# Patient Record
Sex: MALE | Race: WHITE | HISPANIC OR LATINO | Employment: UNEMPLOYED | URBAN - METROPOLITAN AREA
[De-identification: names, ages, dates, MRNs, and addresses within clinical notes are randomized per-mention and may not be internally consistent; named-entity substitution may affect disease eponyms.]

---

## 2019-06-29 ENCOUNTER — HOSPITAL ENCOUNTER (EMERGENCY)
Facility: HOSPITAL | Age: 34
Discharge: HOME | End: 2019-06-29
Attending: EMERGENCY MEDICINE

## 2019-06-29 VITALS
RESPIRATION RATE: 18 BRPM | HEART RATE: 80 BPM | BODY MASS INDEX: 26.52 KG/M2 | SYSTOLIC BLOOD PRESSURE: 120 MMHG | WEIGHT: 165 LBS | HEIGHT: 66 IN | TEMPERATURE: 98 F | OXYGEN SATURATION: 100 % | DIASTOLIC BLOOD PRESSURE: 71 MMHG

## 2019-06-29 DIAGNOSIS — S22.030A TRAUMATIC COMPRESSION FRACTURE OF T3 THORACIC VERTEBRA, CLOSED, INITIAL ENCOUNTER: ICD-10-CM

## 2019-06-29 DIAGNOSIS — S22.060A: Primary | ICD-10-CM
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
## 2019-06-29 DIAGNOSIS — M79.672 LEFT FOOT PAIN: ICD-10-CM

## 2019-06-29 PROCEDURE — 25000003 PHARM REV CODE 250: Performed by: NURSE PRACTITIONER

## 2019-06-29 PROCEDURE — 99283 EMERGENCY DEPT VISIT LOW MDM: CPT | Mod: 25

## 2019-06-29 PROCEDURE — 99284 EMERGENCY DEPT VISIT MOD MDM: CPT | Mod: 25

## 2019-06-29 RX ORDER — IBUPROFEN 600 MG/1
600 TABLET ORAL
Status: COMPLETED | OUTPATIENT
Start: 2019-06-29 | End: 2019-06-29

## 2019-06-29 RX ORDER — HYDROCODONE BITARTRATE AND ACETAMINOPHEN 5; 325 MG/1; MG/1
1 TABLET ORAL EVERY 6 HOURS PRN
Qty: 12 TABLET | Refills: 0 | Status: SHIPPED | OUTPATIENT
Start: 2019-06-29

## 2019-06-29 RX ADMIN — IBUPROFEN 600 MG: 600 TABLET, FILM COATED ORAL at 01:06

## 2019-06-29 NOTE — PROGRESS NOTES
TN received call from YAMIL Buck, pt in ED requires TLSO brace.     TN  Contacted [facility], [facility], [phone]. TN spoke with their on call, Margy. She is currently in [place], she should be here in about one hour and forty-five minutes, no later than 5:15 pm.

## 2019-06-29 NOTE — ED PROVIDER NOTES
Encounter Date: 6/29/2019       History     Chief Complaint   Patient presents with    Fall     fall while moving a bed. denies pain. reports back pain and left foot pain.      34yo male presents to the ED for evaluation after a fall.  Pt was carrying a bed in his home when he tripped and fell, landing on his back onto a hard floor about an hour ago.  No head injury or LOC.  Pt had an immediate onset of back pain.  He reports pain is worse with deep breaths.  No SOB, cough, CP, abd pain, weakness, numbness/tingling.  Pt was ambulatory in to the ED.  He also reports painful ambulation on left foot since the fall.  He has taken a goody's powder and applied an icy hot patch to his back but the pain persists.  No other complaints at this time.     The history is provided by the patient. The history is limited by a language barrier. No  was used (Pt's friend Sherman JUDD acted as .  Also used [employer]).     Review of patient's allergies indicates:  No Known Allergies  No past medical history on file.  No past surgical history on file.  No family history on file.  Social History     Tobacco Use    Smoking status: Never Smoker   Substance Use Topics    Alcohol use: Never     Frequency: Never    Drug use: Never     Review of Systems   Constitutional: Positive for activity change. Negative for fever.   Respiratory: Negative for cough and shortness of breath.    Cardiovascular: Negative for chest pain.   Gastrointestinal: Negative for abdominal pain, nausea and vomiting.   Musculoskeletal: Positive for arthralgias and back pain. Negative for joint swelling, myalgias and neck pain.   Skin: Negative.  Negative for wound.   Allergic/Immunologic: Negative for immunocompromised state.   Neurological: Negative for dizziness, syncope, weakness, light-headedness, numbness and headaches.   Hematological: Does not bruise/bleed easily.   Psychiatric/Behavioral: Negative for confusion.   All other systems  reviewed and are negative.      Physical Exam     Initial Vitals [06/29/19 1301]   BP Pulse Resp Temp SpO2   120/70 83 19 97.8 °F (36.6 °C) 98 %      MAP       --         Physical Exam    Nursing note and vitals reviewed.  Constitutional: Vital signs are normal. He appears well-developed and well-nourished. He is active and cooperative. He is easily aroused.  Non-toxic appearance. He does not have a sickly appearance. He does not appear ill. No distress.   HENT:   Head: Normocephalic and atraumatic.   Eyes: Conjunctivae are normal.   Neck: Normal range of motion, full passive range of motion without pain and phonation normal. Neck supple. No spinous process tenderness and no muscular tenderness present. Normal range of motion present.   Cardiovascular: Normal rate, regular rhythm and normal heart sounds.   Pulses:       Dorsalis pedis pulses are 2+ on the right side, and 2+ on the left side.   Pulmonary/Chest: Effort normal and breath sounds normal. No respiratory distress. He has no decreased breath sounds.   Abdominal: Soft. Normal appearance and bowel sounds are normal. There is no tenderness.   Musculoskeletal:        Left knee: Normal.        Left ankle: Normal.        Cervical back: Normal.        Thoracic back: He exhibits tenderness, bony tenderness and pain. He exhibits normal range of motion, no swelling, no edema, no deformity, no laceration, no spasm and normal pulse.        Lumbar back: Normal.        Back:         Left lower leg: Normal.        Left foot: There is tenderness and bony tenderness. There is normal range of motion, no swelling, normal capillary refill, no crepitus, no deformity and no laceration.        Feet:    Neurological: He is alert, oriented to person, place, and time and easily aroused. He has normal strength. No cranial nerve deficit or sensory deficit. Gait normal. GCS eye subscore is 4. GCS verbal subscore is 5. GCS motor subscore is 6.   Sensation and strength intact bilateral  upper and lower extremities.   Skin: Skin is warm, dry and intact. Capillary refill takes less than 2 seconds. No abrasion, no bruising and no rash noted. No erythema. No pallor.   Psychiatric: He has a normal mood and affect. His speech is normal and behavior is normal. Judgment and thought content normal. Cognition and memory are normal.         ED Course   Procedures  Labs Reviewed - No data to display       Imaging Results          X-Ray Lumbar Spine Ap And Lateral (Final result)  Result time 06/29/19 16:15:50    Final result by Mark Yates MD (06/29/19 16:15:50)                 Impression:      No acute process.      Electronically signed by: Mark Yates MD  Date:    06/29/2019  Time:    16:15             Narrative:    EXAMINATION:  XR LUMBAR SPINE AP AND LATERAL    CLINICAL HISTORY:  T/L-spine trauma, minor-mod, low back pain;    TECHNIQUE:  AP, lateral and spot images were performed of the lumbar spine.    COMPARISON:  None    FINDINGS:  The lumbar alignment is maintained.  There are 5 lumbar type vertebral bodies.  The vertebral body heights are maintained.  The posterior elements are within normal limits.  The transverse processes are unremarkable.  The sacroiliac joints are within normal limits.  There is no evidence of acute fracture or listhesis of the lumbar spine.    The paraspinal soft tissues are within normal limits.  No abnormal calcifications are present.                                CT Thoracic Spine Without Contrast (Final result)  Result time 06/29/19 14:52:54    Final result by Mark Yates MD (06/29/19 14:52:54)                 Impression:      Focal kyphosis of the thoracic spine centered at the T8 level with associated acute anterior compression fracture of the T8 vertebral body with 40% loss of the vertebral body height.  Neurosurgical consultation is suggested.    Acute minimal anterior compression fracture of the T5 vertebral body.    This report was flagged in Epic as  abnormal.      Electronically signed by: Mark Yates MD  Date:    06/29/2019  Time:    14:52             Narrative:    EXAMINATION:  CT THORACIC SPINE WITHOUT CONTRAST    CLINICAL HISTORY:  T/L-spine trauma, minor-mod, low back pain;    TECHNIQUE:  Axial CT scan of the thoracic spine was performed without intravenous contrast.  Coronal and sagittal reformats were obtained.    COMPARISON:  X-ray of the thoracic spine dated 06/29/2019.    FINDINGS:  There is focal kyphosis of the thoracic spine centered at the T8 vertebral level.  The remainder of the thoracic alignment is within normal limits.    There is an acute anterior compression fracture of the T5 vertebral body with approximately 20% loss of the vertebral body height.  There is no evidence of involvement of the posterior cortex.  There is also an acute anterior compression fracture of the T8 vertebral body with approximately 40% loss of the vertebral body height.  The posterior cortex is not involved.  The remainder of the vertebral body heights are maintained.    The posterior elements are unremarkable.  No significant intervertebral disc space narrowing is identified.  There is no evidence of mass effect in the spinal cord.    The paraspinal soft tissues are within normal limits.  The visualized lung fields are unremarkable.  There is no evidence of a pneumothorax.  There is no evidence of pulmonary contusion.                                X-Ray Chest PA And Lateral (Final result)  Result time 06/29/19 14:01:39    Final result by Juli Galo MD (06/29/19 14:01:39)                 Impression:      Moderate compression deformity with loss of vertebral body height and anterior wedging of 1 of the midthoracic vertebral bodies, most likely T8.    This report was flagged in Epic as abnormal.      Electronically signed by: Juli Galo MD  Date:    06/29/2019  Time:    14:01             Narrative:    EXAMINATION:  XR CHEST PA AND LATERAL    CLINICAL  HISTORY:  Unspecified fall, initial encounter    TECHNIQUE:  PA and lateral views of the chest were performed.    COMPARISON:  None    FINDINGS:  The lungs are clear, with normal appearance of pulmonary vasculature and no pleural effusion or pneumothorax.    The cardiac silhouette is normal in size. The hilar and mediastinal contours are unremarkable.    There is a moderate compression deformity with loss of vertebral body height and anterior wedging of 1 of the midthoracic vertebral bodies, most likely T8.  Otherwise, visualized osseous structures are intact.                               X-Ray Foot Complete Left (Final result)  Result time 06/29/19 14:03:03    Final result by Juli Galo MD (06/29/19 14:03:03)                 Impression:      As above.      Electronically signed by: Juli Galo MD  Date:    06/29/2019  Time:    14:03             Narrative:    EXAMINATION:  XR FOOT COMPLETE 3 VIEW LEFT    CLINICAL HISTORY:  .  Unspecified fall, initial encounter    TECHNIQUE:  AP, lateral and oblique views of the left foot were performed.    COMPARISON:  None    FINDINGS:  There is no evidence of fracture or subluxation.  Alignment is preserved.  There are vascular calcifications in the soft tissues, greater than expected for chronological age.                               X-Ray Thoracic Spine AP Lateral (Edited Result - FINAL)  Result time 06/29/19 14:45:32    Addendum 1 of 1 by Juli Galo MD (06/29/19 14:45:32)      There may also be slight loss of height of T5, though this is difficult to confirm on the chest radiographs.      Electronically signed by: Juli Galo MD  Date:    06/29/2019  Time:    14:45               Final result by Juli Galo MD (06/29/19 13:59:38)                 Impression:      Moderate compression deformity, possibly due to fracture given the clinical history of fall, of 1 of the midthoracic vertebral bodies, most likely T8.    This report was flagged in  Epic as abnormal.      Electronically signed by: Juli Galo MD  Date:    06/29/2019  Time:    13:59             Narrative:    EXAMINATION:  XR THORACIC SPINE AP LATERAL    CLINICAL HISTORY:  fall;    TECHNIQUE:  AP and lateral views of the thoracic spine were performed.    COMPARISON:  None    FINDINGS:  There are 12 rib-bearing thoracic type vertebral bodies.  There is moderate compression deformity with approximately 40% loss of vertebral body height and anterior wedging of 1 of the midthoracic vertebral bodies, probably T8.  There is mild focal kyphosis at the site of the fracture.  The other visualized thoracic vertebral body heights are satisfactorily maintained.  No significant hypertrophic degenerative change.                                 Medical Decision Making:   Initial Assessment:   33-year-old male here for back pain after ground level fall 1 hr prior to arrival.  No head injury or neck pain. The patient appears well, nontoxic.  He has midline thoracic spine tenderness to palpation.  Sensation and strength intact bilateral upper and lower extremities.  Tenderness to palpation of the left lateral midfoot.  Ankle normal.  Differential Diagnosis:   Contusion, fracture, strain, sprain, discogenic pain  Clinical Tests:   Radiological Study: Ordered and Reviewed  ED Management:  X-rays, Motrin, CT T-spine  Other:   I have discussed this case with another health care provider.       <> Summary of the Discussion: Discussed with , neurosurgery- advised placing patient in TSLO brace and may f/u outpatient in one week.   X-ray left foot is normal. X-rays of his T-spine did visualize a fracture so his CT scan was ordered.  CT does reveal fractures of the T8 and T 5 vertebrae.  CT findings were discussed with Neurosurgery and is the patient's pain is well controlled, he may follow up outpatient within 1 week.  The patient did report significant improvement of pain after Motrin and declined additional  medication.  He was given a TLSO brace and advised to wear the brace until he was seen by Neurosurgery.  Review narcotic prescription precautions.  Also reviewed return precautions including weakness, numbness/tingling, difficulty breathing, or if he has any questions or concerns.  The patient verbalized understanding, compliance, and agreement with the treatment plan.  The patient's friend and Leana did act as  for this ED course.  Rx [place].                      Clinical Impression:       ICD-10-CM ICD-9-CM   1. Traumatic compression fracture of T8 thoracic vertebra, closed, initial encounter S22.060A 805.2   2. Fall W19.XXXA E888.9   3. Traumatic compression fracture of T3 thoracic vertebra, closed, initial encounter S22.030A 805.2   4. Left foot pain M79.672 729.5                                Edna Myers, YAMIL  06/29/19 4558

## 2019-06-29 NOTE — ED NOTES
Pt presents to the ED c/o left foot and back pain s/p fall while walking backwards on today. Left foot pain with ambulation.  Back pain with movement and deep breathing Pian rated 10/10 .  Pt [place] speaking/friend Sherman Bledsoe translated

## 2019-06-29 NOTE — ED NOTES
APPEARANCE: Alert, oriented and in no acute distress.  CARDIAC: Normal rate and rhythm, no murmur heard.   PERIPHERAL VASCULAR: peripheral pulses present. Normal cap refill. No edema. Warm to touch.    RESPIRATORY:Normal rate and effort, breath sounds clear bilaterally throughout chest. Respirations are equal and unlabored no obvious signs of distress.  GASTRO: soft, bowel sounds normal, no tenderness, no abdominal distention.  MUSC:Pain to the left foot and middle back   SKIN: Skin is warm and dry, normal skin turgor, mucous membranes moist.  NEURO: 5/5 strength major flexors/extensors bilaterally. Sensory intact to light touch bilaterally. [place] coma scale: eyes open spontaneously-4, oriented & converses-5, obeys commands-6. No neurological abnormalities.   MENTAL STATUS: awake, alert and aware of environment.  EYE: PERRL, both eyes: pupils brisk and reactive to light. Normal size.  .